# Patient Record
Sex: MALE | Race: WHITE | NOT HISPANIC OR LATINO | Employment: UNEMPLOYED | URBAN - METROPOLITAN AREA
[De-identification: names, ages, dates, MRNs, and addresses within clinical notes are randomized per-mention and may not be internally consistent; named-entity substitution may affect disease eponyms.]

---

## 2024-07-21 ENCOUNTER — OFFICE VISIT (OUTPATIENT)
Dept: URGENT CARE | Facility: CLINIC | Age: 54
End: 2024-07-21
Payer: COMMERCIAL

## 2024-07-21 VITALS
BODY MASS INDEX: 29.99 KG/M2 | TEMPERATURE: 98 F | OXYGEN SATURATION: 98 % | DIASTOLIC BLOOD PRESSURE: 76 MMHG | WEIGHT: 180 LBS | HEIGHT: 65 IN | HEART RATE: 79 BPM | SYSTOLIC BLOOD PRESSURE: 118 MMHG

## 2024-07-21 DIAGNOSIS — L03.113 CELLULITIS OF RIGHT UPPER EXTREMITY: Primary | ICD-10-CM

## 2024-07-21 PROCEDURE — 99213 OFFICE O/P EST LOW 20 MIN: CPT

## 2024-07-21 RX ORDER — CEPHALEXIN 500 MG/1
500 CAPSULE ORAL EVERY 6 HOURS SCHEDULED
Qty: 28 CAPSULE | Refills: 0 | Status: SHIPPED | OUTPATIENT
Start: 2024-07-21 | End: 2024-07-28

## 2024-07-21 NOTE — PATIENT INSTRUCTIONS
Take the antibiotic as prescribed  Cool compresses to the arm  Tylenol Motrin for pain or discomfort  If the area gets larger if you develop chills or fever go to the emergency room

## 2024-07-21 NOTE — PROGRESS NOTES
[facility]        NAME: Adolfo Bond is a 53 y.o. male  : 1970    MRN: [id number]  DATE: 2024  TIME: 10:24 AM    Assessment and Plan   Cellulitis of right upper extremity [L03.113]  1. Cellulitis of right upper extremity  cephalexin (KEFLEX) 500 mg capsule            Patient Instructions   Take the antibiotic as prescribed  Cool compresses to the arm  Tylenol Motrin for pain or discomfort  If the area gets larger if you develop chills or fever go to the emergency room    Follow up with PCP in 3-5 days.  Proceed to  ER if symptoms worsen.    If tests have been performed at [facility], our office will contact you with results if changes need to be made to the care plan discussed with you at the visit.  You can review your full results on [facility].    Chief Complaint     Chief Complaint   Patient presents with    Insect Bite     Patient states that a hornet stung him on Friday on his right arm. The site is read and swollen. He has been icing it and took some benadryl.          History of Present Illness       This is a 53-year-old male who presents today after being stung by a hornet on Friday.  To the right forearm.  He had slight swelling but during the night he states he had increased pain and increased swelling.  He did take Benadryl yesterday with no relief.  He has been taking some over-the-counter generic allergy medicine.  He states that the sting was initially quarter size now it encompasses his proximal right forearm and the elbow.  His arm is swollen and warm to the touch and erythematous.  +2 radial pulse.  He has full range of motion of the arm.  He states that a couple years ago he had the same issue with a bee sting on his leg    Insect Bite  Associated symptoms include myalgias. Pertinent negatives include no diaphoresis or fever.       Review of Systems   Review of Systems   Constitutional: Negative.  Negative for diaphoresis and fever.   HENT: Negative.    "  Respiratory: Negative.     Cardiovascular: Negative.    Gastrointestinal: Negative.    Genitourinary: Negative.    Musculoskeletal:  Positive for myalgias.   Skin:  Positive for wound.   Neurological: Negative.          Current Medications       Current Outpatient Medications:     cephalexin (KEFLEX) 500 mg capsule, Take 1 capsule (500 mg total) by mouth every 6 (six) hours for 7 days, Disp: 28 capsule, Rfl: 0    Current Allergies     Allergies as of 07/21/2024    (No Known Allergies)            The following portions of the patient's history were reviewed and updated as appropriate: allergies, current medications, past family history, past medical history, past social history, past surgical history and problem list.     No past medical history on file.    No past surgical history on file.    No family history on file.      Medications have been verified.        Objective   /76   Pulse 79   Temp 98 °F (36.7 °C)   Ht 5' 5\" (1.651 m)   Wt 81.6 kg (180 lb)   SpO2 98%   BMI 29.95 kg/m²   No LMP for male patient.       Physical Exam     Physical Exam  Constitutional:       Appearance: Normal appearance. He is normal weight.   HENT:      Head: Normocephalic and atraumatic.      Right Ear: Tympanic membrane, ear canal and external ear normal.      Left Ear: Tympanic membrane, ear canal and external ear normal.      Nose: Nose normal.      Mouth/Throat:      Mouth: Mucous membranes are moist.      Pharynx: Oropharynx is clear.   Eyes:      Conjunctiva/sclera: Conjunctivae normal.      Pupils: Pupils are equal, round, and reactive to light.   Cardiovascular:      Rate and Rhythm: Normal rate and regular rhythm.      Pulses: Normal pulses.      Heart sounds: Normal heart sounds.   Pulmonary:      Effort: Pulmonary effort is normal.      Breath sounds: Normal breath sounds.   Abdominal:      General: Abdomen is flat. Bowel sounds are normal.   Musculoskeletal:         General: Normal range of motion.   Skin:     " Capillary Refill: Capillary refill takes less than 2 seconds.      Coloration: Skin is not jaundiced.      Findings: Erythema and rash present. No lesion.             Comments: Patient has swelling erythema and warm from just above his antecubital to his mid forearm.  He states that it occasionally itches.  He is able to bend his elbow without any difficulty radial pulse +2   Neurological:      General: No focal deficit present.      Mental Status: He is alert and oriented to person, place, and time.   Psychiatric:         Mood and Affect: Mood normal.         Thought Content: Thought content normal.         Judgment: Judgment normal.

## 2024-08-05 ENCOUNTER — APPOINTMENT (EMERGENCY)
Dept: RADIOLOGY | Facility: HOSPITAL | Age: 54
End: 2024-08-05
Payer: COMMERCIAL

## 2024-08-05 ENCOUNTER — HOSPITAL ENCOUNTER (EMERGENCY)
Facility: HOSPITAL | Age: 54
Discharge: HOME | End: 2024-08-05
Attending: EMERGENCY MEDICINE
Payer: COMMERCIAL

## 2024-08-05 ENCOUNTER — APPOINTMENT (EMERGENCY)
Dept: CT IMAGING | Facility: HOSPITAL | Age: 54
End: 2024-08-05
Payer: COMMERCIAL

## 2024-08-05 VITALS
HEART RATE: 69 BPM | OXYGEN SATURATION: 98 % | RESPIRATION RATE: 15 BRPM | TEMPERATURE: 98.4 F | SYSTOLIC BLOOD PRESSURE: 115 MMHG | DIASTOLIC BLOOD PRESSURE: 57 MMHG

## 2024-08-05 DIAGNOSIS — N20.1 LEFT URETERAL STONE: Primary | ICD-10-CM

## 2024-08-05 DIAGNOSIS — N13.30 HYDRONEPHROSIS: ICD-10-CM

## 2024-08-05 LAB
ALBUMIN SERPL BCG-MCNC: 4.8 G/DL (ref 3.5–5)
ALP SERPL-CCNC: 55 U/L (ref 34–104)
ALT SERPL W P-5'-P-CCNC: 25 U/L (ref 7–52)
ANION GAP SERPL CALCULATED.3IONS-SCNC: 14 MMOL/L (ref 4–13)
AST SERPL W P-5'-P-CCNC: 19 U/L (ref 13–39)
ATRIAL RATE: 74 BPM
BACTERIA UR QL AUTO: NORMAL /HPF
BASOPHILS # BLD AUTO: 0.04 THOUSANDS/ÂΜL (ref 0–0.1)
BASOPHILS NFR BLD AUTO: 0 % (ref 0–1)
BILIRUB SERPL-MCNC: 0.6 MG/DL (ref 0.2–1)
BILIRUB UR QL STRIP: NEGATIVE
BUN SERPL-MCNC: 17 MG/DL (ref 5–25)
CALCIUM SERPL-MCNC: 9.9 MG/DL (ref 8.4–10.2)
CARDIAC TROPONIN I PNL SERPL HS: 5 NG/L
CHLORIDE SERPL-SCNC: 103 MMOL/L (ref 96–108)
CLARITY UR: CLEAR
CO2 SERPL-SCNC: 20 MMOL/L (ref 21–32)
COLOR UR: YELLOW
CREAT SERPL-MCNC: 1.25 MG/DL (ref 0.6–1.3)
EOSINOPHIL # BLD AUTO: 0.01 THOUSAND/ÂΜL (ref 0–0.61)
EOSINOPHIL NFR BLD AUTO: 0 % (ref 0–6)
ERYTHROCYTE [DISTWIDTH] IN BLOOD BY AUTOMATED COUNT: 12 % (ref 11.6–15.1)
GFR SERPL CREATININE-BSD FRML MDRD: 65 ML/MIN/1.73SQ M
GLUCOSE SERPL-MCNC: 113 MG/DL (ref 65–140)
GLUCOSE UR STRIP-MCNC: NEGATIVE MG/DL
HCT VFR BLD AUTO: 42.4 % (ref 36.5–49.3)
HGB BLD-MCNC: 14.2 G/DL (ref 12–17)
HGB UR QL STRIP.AUTO: ABNORMAL
IMM GRANULOCYTES # BLD AUTO: 0.08 THOUSAND/UL (ref 0–0.2)
IMM GRANULOCYTES NFR BLD AUTO: 1 % (ref 0–2)
KETONES UR STRIP-MCNC: ABNORMAL MG/DL
LEUKOCYTE ESTERASE UR QL STRIP: NEGATIVE
LIPASE SERPL-CCNC: 25 U/L (ref 11–82)
LYMPHOCYTES # BLD AUTO: 1.69 THOUSANDS/ÂΜL (ref 0.6–4.47)
LYMPHOCYTES NFR BLD AUTO: 16 % (ref 14–44)
MCH RBC QN AUTO: 29 PG (ref 26.8–34.3)
MCHC RBC AUTO-ENTMCNC: 33.5 G/DL (ref 31.4–37.4)
MCV RBC AUTO: 87 FL (ref 82–98)
MONOCYTES # BLD AUTO: 0.7 THOUSAND/ÂΜL (ref 0.17–1.22)
MONOCYTES NFR BLD AUTO: 7 % (ref 4–12)
NEUTROPHILS # BLD AUTO: 7.91 THOUSANDS/ÂΜL (ref 1.85–7.62)
NEUTS SEG NFR BLD AUTO: 76 % (ref 43–75)
NITRITE UR QL STRIP: NEGATIVE
NON-SQ EPI CELLS URNS QL MICRO: NORMAL /HPF
NRBC BLD AUTO-RTO: 0 /100 WBCS
P AXIS: 42 DEGREES
PH UR STRIP.AUTO: 7.5 [PH]
PLATELET # BLD AUTO: 366 THOUSANDS/UL (ref 149–390)
PMV BLD AUTO: 9.5 FL (ref 8.9–12.7)
POTASSIUM SERPL-SCNC: 4 MMOL/L (ref 3.5–5.3)
PR INTERVAL: 154 MS
PROT SERPL-MCNC: 7.8 G/DL (ref 6.4–8.4)
PROT UR STRIP-MCNC: NEGATIVE MG/DL
QRS AXIS: 25 DEGREES
QRSD INTERVAL: 106 MS
QT INTERVAL: 408 MS
QTC INTERVAL: 452 MS
RBC # BLD AUTO: 4.9 MILLION/UL (ref 3.88–5.62)
RBC #/AREA URNS AUTO: NORMAL /HPF
SODIUM SERPL-SCNC: 137 MMOL/L (ref 135–147)
SP GR UR STRIP.AUTO: 1.01 (ref 1–1.03)
T WAVE AXIS: 33 DEGREES
UROBILINOGEN UR STRIP-ACNC: <2 MG/DL
VENTRICULAR RATE: 74 BPM
WBC # BLD AUTO: 10.43 THOUSAND/UL (ref 4.31–10.16)
WBC #/AREA URNS AUTO: NORMAL /HPF

## 2024-08-05 PROCEDURE — 71046 X-RAY EXAM CHEST 2 VIEWS: CPT

## 2024-08-05 PROCEDURE — 96374 THER/PROPH/DIAG INJ IV PUSH: CPT

## 2024-08-05 PROCEDURE — 81001 URINALYSIS AUTO W/SCOPE: CPT

## 2024-08-05 PROCEDURE — 80053 COMPREHEN METABOLIC PANEL: CPT

## 2024-08-05 PROCEDURE — 99285 EMERGENCY DEPT VISIT HI MDM: CPT

## 2024-08-05 PROCEDURE — 74176 CT ABD & PELVIS W/O CONTRAST: CPT

## 2024-08-05 PROCEDURE — 93005 ELECTROCARDIOGRAM TRACING: CPT

## 2024-08-05 PROCEDURE — 99284 EMERGENCY DEPT VISIT MOD MDM: CPT

## 2024-08-05 PROCEDURE — 96361 HYDRATE IV INFUSION ADD-ON: CPT

## 2024-08-05 PROCEDURE — 93010 ELECTROCARDIOGRAM REPORT: CPT | Performed by: INTERNAL MEDICINE

## 2024-08-05 PROCEDURE — 84484 ASSAY OF TROPONIN QUANT: CPT

## 2024-08-05 PROCEDURE — 36415 COLL VENOUS BLD VENIPUNCTURE: CPT

## 2024-08-05 PROCEDURE — 83690 ASSAY OF LIPASE: CPT

## 2024-08-05 PROCEDURE — 85025 COMPLETE CBC W/AUTO DIFF WBC: CPT

## 2024-08-05 RX ORDER — IBUPROFEN 600 MG/1
TABLET ORAL
Qty: 18 TABLET | Refills: 0 | Status: SHIPPED | OUTPATIENT
Start: 2024-08-05 | End: 2024-08-10

## 2024-08-05 RX ORDER — ONDANSETRON 2 MG/ML
4 INJECTION INTRAMUSCULAR; INTRAVENOUS ONCE
Status: DISCONTINUED | OUTPATIENT
Start: 2024-08-05 | End: 2024-08-05 | Stop reason: HOSPADM

## 2024-08-05 RX ORDER — KETOROLAC TROMETHAMINE 30 MG/ML
15 INJECTION, SOLUTION INTRAMUSCULAR; INTRAVENOUS ONCE
Status: COMPLETED | OUTPATIENT
Start: 2024-08-05 | End: 2024-08-05

## 2024-08-05 RX ORDER — OXYCODONE HYDROCHLORIDE 5 MG/1
5 TABLET ORAL EVERY 6 HOURS PRN
Qty: 8 TABLET | Refills: 0 | Status: SHIPPED | OUTPATIENT
Start: 2024-08-05

## 2024-08-05 RX ORDER — TAMSULOSIN HYDROCHLORIDE 0.4 MG/1
0.4 CAPSULE ORAL
Qty: 14 CAPSULE | Refills: 0 | Status: SHIPPED | OUTPATIENT
Start: 2024-08-05 | End: 2024-08-19

## 2024-08-05 RX ORDER — ONDANSETRON 4 MG/1
4 TABLET, ORALLY DISINTEGRATING ORAL EVERY 6 HOURS PRN
Qty: 20 TABLET | Refills: 0 | Status: SHIPPED | OUTPATIENT
Start: 2024-08-05

## 2024-08-05 RX ORDER — ACETAMINOPHEN 500 MG
1000 TABLET ORAL 3 TIMES DAILY PRN
Qty: 30 TABLET | Refills: 0 | Status: SHIPPED | OUTPATIENT
Start: 2024-08-05 | End: 2024-08-10

## 2024-08-05 RX ADMIN — SODIUM CHLORIDE 1000 ML: 0.9 INJECTION, SOLUTION INTRAVENOUS at 11:56

## 2024-08-05 RX ADMIN — SODIUM CHLORIDE 1000 ML: 0.9 INJECTION, SOLUTION INTRAVENOUS at 08:46

## 2024-08-05 RX ADMIN — KETOROLAC TROMETHAMINE 15 MG: 30 INJECTION, SOLUTION INTRAMUSCULAR; INTRAVENOUS at 08:59

## 2024-08-05 NOTE — DISCHARGE INSTRUCTIONS
Use the prescribed medications as directed to assist with passing your kidney stone.  Increase your fluids by taking small sips of water throughout the day.  Strain all urine until successful passage of your stone.  Follow-up with urology regarding your new finding of kidney stones.  Return to the ER if develop fever, inability to urinate, new or worsening pain, inability to tolerate fluids, weakness, confusion, or lethargy.

## 2024-08-05 NOTE — ED PROVIDER NOTES
"History  Chief Complaint   Patient presents with    Flank Pain     Pt6 reports left sided flank pain. Pt states \"I think its kidney stones. I am starting to get nauseous. I had blood in my urine.\"     The patient is a 53-year-old male with no significant PMH presenting for evaluation of 4 days of left flank pain.  The patient started on Friday (4 days PTA) with nonradiating left flank pain described as intermittent, sharp, dull at times, with associated nausea and sometimes sweatiness.  The patient reports associated hematuria which raises his concern for a kidney stone.  He has no formal documentation of prior kidney stone but believes he may have had one approximately 7 years ago.  He denies associated fevers or chills.  He does report having a URI a few weeks ago with lingering dry nonproductive cough.  He does report the pain is so severe at times takes his breath away.  He denies associated lightheadedness/dizziness, chest pain, persistent SOB, palpitations, abdominal pain, change in bowel, dysuria, urinary urgency, frequency, or foul-smelling urine.  He notes a prior umbilical hernia repair many years ago, he otherwise denies all other abdominal surgical history.  He notes his most recent episode of pain was from 1 AM until this morning with an episode of emesis (NBNB) for which he presents today for further evaluation.      History provided by:  Patient and significant other   used: No    Flank Pain  Associated symptoms: hematuria, nausea and vomiting    Associated symptoms: no chest pain, no chills, no dysuria, no fever and no shortness of breath        None       History reviewed. No pertinent past medical history.    History reviewed. No pertinent surgical history.    History reviewed. No pertinent family history.  I have reviewed and agree with the history as documented.    E-Cigarette/Vaping     E-Cigarette/Vaping Substances          Review of Systems   Constitutional:  Positive for " diaphoresis. Negative for chills and fever.   Respiratory:  Negative for shortness of breath.         Denies shortness of breath, notes the pain sometimes takes his breath away   Cardiovascular:  Negative for chest pain and palpitations.   Gastrointestinal:  Positive for nausea and vomiting. Negative for abdominal pain.   Genitourinary:  Positive for flank pain (Left-sided) and hematuria. Negative for decreased urine volume, difficulty urinating, dysuria, frequency, scrotal swelling, testicular pain and urgency.   Musculoskeletal:  Positive for back pain (Left middle back). Negative for gait problem.   Skin:  Negative for color change, rash and wound.   All other systems reviewed and are negative.      Physical Exam  Physical Exam  Vitals and nursing note reviewed.   Constitutional:       General: He is not in acute distress.     Appearance: Normal appearance. He is normal weight. He is not ill-appearing or toxic-appearing.   HENT:      Head: Normocephalic and atraumatic.      Nose: Nose normal.      Mouth/Throat:      Mouth: Mucous membranes are moist.      Pharynx: Oropharynx is clear.   Eyes:      Extraocular Movements: Extraocular movements intact.      Conjunctiva/sclera: Conjunctivae normal.   Cardiovascular:      Rate and Rhythm: Normal rate and regular rhythm.      Pulses:           Radial pulses are 2+ on the right side and 2+ on the left side.      Heart sounds: Normal heart sounds, S1 normal and S2 normal. No murmur heard.     No systolic murmur is present.   Pulmonary:      Effort: Pulmonary effort is normal. No respiratory distress.      Breath sounds: Normal breath sounds and air entry.   Abdominal:      General: There is no distension.      Palpations: Abdomen is soft.      Tenderness: There is abdominal tenderness in the left upper quadrant and left lower quadrant. There is left CVA tenderness. There is no right CVA tenderness, guarding or rebound. Negative signs include Stewart's sign.    Musculoskeletal:         General: Normal range of motion.      Cervical back: Normal range of motion and neck supple.   Skin:     General: Skin is warm and dry.      Capillary Refill: Capillary refill takes less than 2 seconds.      Findings: No rash.   Neurological:      General: No focal deficit present.      Mental Status: He is alert and oriented to person, place, and time. Mental status is at baseline.         Vital Signs  ED Triage Vitals [08/05/24 0827]   Temperature Pulse Respirations Blood Pressure SpO2   98.4 °F (36.9 °C) 88 18 135/80 100 %      Temp Source Heart Rate Source Patient Position - Orthostatic VS BP Location FiO2 (%)   Oral Monitor Standing Left arm --      Pain Score       6           Vitals:    08/05/24 0827 08/05/24 0930 08/05/24 1000 08/05/24 1200   BP: 135/80 116/64 118/59 115/57   Pulse: 88 79 79 69   Patient Position - Orthostatic VS: Standing Lying  Lying         Visual Acuity  Visual Acuity      Flowsheet Row Most Recent Value   L Pupil Size (mm) 3   R Pupil Size (mm) 3            ED Medications  Medications   ondansetron (ZOFRAN) injection 4 mg (0 mg Intravenous Hold 8/5/24 0846)   sodium chloride 0.9 % bolus 1,000 mL (1,000 mL Intravenous New Bag 8/5/24 1156)   sodium chloride 0.9 % bolus 1,000 mL (0 mL Intravenous Stopped 8/5/24 1110)   ketorolac (TORADOL) injection 15 mg (15 mg Intravenous Given 8/5/24 0859)       Diagnostic Studies  Results Reviewed       Procedure Component Value Units Date/Time    Urine Microscopic [[id number]]  (Normal) Collected: 08/05/24 1037    Lab Status: Final result Specimen: Urine, Clean Catch Updated: 08/05/24 1102     RBC, UA 1-2 /hpf      WBC, UA 0-1 /hpf      Epithelial Cells None Seen /hpf      Bacteria, UA None Seen /hpf     UA w Reflex to Microscopic w Reflex to Culture [[id number]]  (Abnormal) Collected: 08/05/24 1037    Lab Status: Final result Specimen: Urine, Clean Catch Updated: 08/05/24 1045     Color, UA Yellow     Clarity, UA Clear      Specific Gravity, UA 1.010     pH, UA 7.5     Leukocytes, UA Negative     Nitrite, UA Negative     Protein, UA Negative mg/dl      Glucose, UA Negative mg/dl      Ketones, UA 10 (1+) mg/dl      Urobilinogen, UA <2.0 mg/dl      Bilirubin, UA Negative     Occult Blood, UA Trace    HS Troponin 0hr (reflex protocol) [[id number]]  (Normal) Collected: 08/05/24 0845    Lab Status: Final result Specimen: Blood from Arm, Left Updated: 08/05/24 0916     hs TnI 0hr 5 ng/L     Comprehensive metabolic panel [[id number]]  (Abnormal) Collected: 08/05/24 0845    Lab Status: Final result Specimen: Blood from Arm, Left Updated: 08/05/24 0910     Sodium 137 mmol/L      Potassium 4.0 mmol/L      Chloride 103 mmol/L      CO2 20 mmol/L      ANION GAP 14 mmol/L      BUN 17 mg/dL      Creatinine 1.25 mg/dL      Glucose 113 mg/dL      Calcium 9.9 mg/dL      AST 19 U/L      ALT 25 U/L      Alkaline Phosphatase 55 U/L      Total Protein 7.8 g/dL      Albumin 4.8 g/dL      Total Bilirubin 0.60 mg/dL      eGFR 65 ml/min/1.73sq m     Narrative:      National Kidney Disease Foundation guidelines for Chronic Kidney Disease (CKD):     Stage 1 with normal or high GFR (GFR > 90 mL/min/1.73 square meters)    Stage 2 Mild CKD (GFR = 60-89 mL/min/1.73 square meters)    Stage 3A Moderate CKD (GFR = 45-59 mL/min/1.73 square meters)    Stage 3B Moderate CKD (GFR = 30-44 mL/min/1.73 square meters)    Stage 4 Severe CKD (GFR = 15-29 mL/min/1.73 square meters)    Stage 5 End Stage CKD (GFR <15 mL/min/1.73 square meters)  Note: GFR calculation is accurate only with a steady state creatinine    Lipase [[id number]]  (Normal) Collected: 08/05/24 0845    Lab Status: Final result Specimen: Blood from Arm, Left Updated: 08/05/24 0910     Lipase 25 u/L     CBC and differential [[id number]]  (Abnormal) Collected: 08/05/24 0845    Lab Status: Final result Specimen: Blood from Arm, Left Updated: 08/05/24 0855     WBC 10.43 Thousand/uL      RBC 4.90 Million/uL       Hemoglobin 14.2 g/dL      Hematocrit 42.4 %      MCV 87 fL      MCH 29.0 pg      MCHC 33.5 g/dL      RDW 12.0 %      MPV 9.5 fL      Platelets 366 Thousands/uL      nRBC 0 /100 WBCs      Segmented % 76 %      Immature Grans % 1 %      Lymphocytes % 16 %      Monocytes % 7 %      Eosinophils Relative 0 %      Basophils Relative 0 %      Absolute Neutrophils 7.91 Thousands/µL      Absolute Immature Grans 0.08 Thousand/uL      Absolute Lymphocytes 1.69 Thousands/µL      Absolute Monocytes 0.70 Thousand/µL      Eosinophils Absolute 0.01 Thousand/µL      Basophils Absolute 0.04 Thousands/µL                    CT abdomen pelvis wo contrast   Final Result by Matt Mcgarry MD (08/05 0922)      Mild left-sided hydroureteronephrosis to the level of a 4 mm obstructing distal ureteral calculus adjacent to the urinary bladder.      Punctate nonobstructing right intrarenal calculus.      Prostatomegaly indenting the urinary bladder base.      The study was marked in EPIC for immediate notification.      Workstation performed: [id number]         XR chest 2 views   Final Result by Dipak Rader MD (08/05 0916)      No acute cardiopulmonary disease.            Workstation performed: [id number]                    Procedures  ECG 12 Lead Documentation Only    Date/Time: 8/5/2024 8:55 AM    Performed by: WOLF Haddad  Authorized by: WOFL Haddad    Indications / Diagnosis:  Flank pain, N/V  ECG reviewed by me, the ED Provider: yes    Patient location:  ED  Previous ECG:     Previous ECG:  Unavailable    Comparison to cardiac monitor: Yes    Interpretation:     Interpretation: non-specific    Rate:     ECG rate:  74    ECG rate assessment: normal    Rhythm:     Rhythm: sinus rhythm    Ectopy:     Ectopy: none    QRS:     QRS axis:  Normal    QRS intervals:  Normal  Conduction:     Conduction: normal    ST segments:     ST segments:  Normal  T waves:     T waves: inverted      Inverted:  III  Comments:      Sinus rhythm,  normal axis, normal intervals, nonspecific T wave O'Elizabet, no acute ischemic changes read by me           ED Course  ED Course as of 08/05/24 1219   Mon Aug 05, 2024   0920 XR chest 2 views  IMPRESSION:     No acute cardiopulmonary disease.     0930 CT abdomen pelvis wo contrast  IMPRESSION:     Mild left-sided hydroureteronephrosis to the level of a 4 mm obstructing distal ureteral calculus adjacent to the urinary bladder.     Punctate nonobstructing right intrarenal calculus.     Prostatomegaly indenting the urinary bladder base.                 HEART Risk Score      Flowsheet Row Most Recent Value   Heart Score Risk Calculator    History 0 Filed at: 08/05/2024 1219   ECG 0 Filed at: 08/05/2024 1219   Age 1 Filed at: 08/05/2024 1219   Risk Factors 1 Filed at: 08/05/2024 1219   Troponin 0 Filed at: 08/05/2024 1219   HEART Score 2 Filed at: 08/05/2024 1219                          SBIRT 20yo+      Flowsheet Row Most Recent Value   Initial Alcohol Screen: US AUDIT-C     1. How often do you have a drink containing alcohol? 0 Filed at: 08/05/2024 0839   2. How many drinks containing alcohol do you have on a typical day you are drinking?  0 Filed at: 08/05/2024 0839   3a. Male UNDER 65: How often do you have five or more drinks on one occasion? 0 Filed at: 08/05/2024 0839   Audit-C Score 0 Filed at: 08/05/2024 0839   JOE: How many times in the past year have you...    Used an illegal drug or used a prescription medication for non-medical reasons? Never Filed at: 08/05/2024 0839                      Medical Decision Making  DDx including but not limited to: renal colic, pyelonephritis, UTI, GI etiology; considered but less likely appendicitis, diverticulitis, pancreatitis, cholecystitis, biliary colic, zoster.      Labs, UA, CT imaging obtained.  Labs without electrolyte derangement, organ dysfunction, or anemia.  UA without evidence of infection.  CT imaging with finding of distal left 4 mm ureteral calculus.  Likely  source of patient's left-sided renal colic.  Given fluids and Toradol with notable improvement in pain.  Plan made for referral to urology for first-time finding of nephrolithiasis and ureteral stone, NSAIDs, Tylenol, as needed Zofran and oxycodone, and strict return precautions.    Problems Addressed:  Hydronephrosis: self-limited or minor problem  Left ureteral stone: acute illness or injury    Amount and/or Complexity of Data Reviewed  Independent Historian: spouse  Labs: ordered.  Radiology: ordered. Decision-making details documented in ED Course.  ECG/medicine tests: ordered and independent interpretation performed.    Risk  OTC drugs.  Prescription drug management.                 Disposition  Final diagnoses:   Left ureteral stone   Hydronephrosis     Time reflects when diagnosis was documented in both MDM as applicable and the Disposition within this note       Time User Action Codes Description Comment    8/5/2024 11:20 AM Joyce Booth [N20.1] Left ureteral stone     8/5/2024 11:20 AM Joyce Booth [N13.30] Hydronephrosis           ED Disposition       ED Disposition   Discharge    Condition   Stable    Date/Time   Mon Aug 5, 2024 1120    Comment   Adolfo Bond discharge to home/self care.                   Follow-up Information       Follow up With Specialties Details Why Contact Info Additional Information    [facility] Schedule an appointment as soon as possible for a visit in 1 week  [address]  [address] [address]  [phone] [facility], [address] [address], [place], [place], [address]   [phone]            Patient's Medications   Discharge Prescriptions    ACETAMINOPHEN (TYLENOL) 500 MG TABLET    Take 2 tablets (1,000 mg total) by mouth 3 (three) times a day as needed for mild pain or moderate pain for up to 5 days       Start Date: 8/5/2024  End Date: 8/10/2024       Order Dose:  1,000 mg       Quantity: 30 tablet    Refills: 0    IBUPROFEN (MOTRIN) 600 MG TABLET    Take 1 tablet (600 mg total) by mouth 3 (three) times a day with meals for 3 days, THEN 1 tablet (600 mg total) 3 (three) times a day as needed for mild pain for up to 3 days.       Start Date: 8/5/2024  End Date: 8/10/2024       Order Dose: --       Quantity: 18 tablet    Refills: 0    ONDANSETRON (ZOFRAN-ODT) 4 MG DISINTEGRATING TABLET    Take 1 tablet (4 mg total) by mouth every 6 (six) hours as needed for nausea or vomiting       Start Date: 8/5/2024  End Date: --       Order Dose: 4 mg       Quantity: 20 tablet    Refills: 0    OXYCODONE (ROXICODONE) 5 IMMEDIATE RELEASE TABLET    Take 1 tablet (5 mg total) by mouth every 6 (six) hours as needed for severe pain Max Daily Amount: 20 mg       Start Date: 8/5/2024  End Date: --       Order Dose: 5 mg       Quantity: 8 tablet    Refills: 0    TAMSULOSIN (FLOMAX) 0.4 MG    Take 1 capsule (0.4 mg total) by mouth daily with dinner for 14 days       Start Date: 8/5/2024  End Date: 8/19/2024       Order Dose: 0.4 mg       Quantity: 14 capsule    Refills: 0           PDMP Review       None            ED Provider  Electronically Signed by             WOLF Haddad  08/05/24 1319

## 2024-08-06 ENCOUNTER — TELEPHONE (OUTPATIENT)
Age: 54
End: 2024-08-06

## 2024-08-06 NOTE — TELEPHONE ENCOUNTER
Pt returning call to [facility] regarding previous message.  Pt was questioning t5he phone number that will come up when his call is returned.  Suggested to pt that he answer whichever incoming call he gets to assure he does not miss the returned call.  Please review.    Pt call back: [phone]

## 2024-08-06 NOTE — TELEPHONE ENCOUNTER
NP- ref from ER for Left ureteral stone         Patient was seen in ER on 8/5/24     Patient had CT done on 8/5/24:    IMPRESSION:     Mild left-sided hydroureteronephrosis to the level of a 4 mm obstructing distal ureteral calculus adjacent to the urinary bladder.     Punctate nonobstructing right intrarenal calculus.     Prostatomegaly indenting the urinary bladder base.     The study was marked in EPIC for immediate notification.    Due to obstruction/hydro patient will need to be placed in a more urgent spot. Please call patient to schedule in appropriate time frame. Patient states he is still in pain at this time.       CB:  [phone]

## 2024-08-09 ENCOUNTER — OFFICE VISIT (OUTPATIENT)
Dept: UROLOGY | Facility: MEDICAL CENTER | Age: 54
End: 2024-08-09
Payer: COMMERCIAL

## 2024-08-09 VITALS
WEIGHT: 181 LBS | OXYGEN SATURATION: 96 % | DIASTOLIC BLOOD PRESSURE: 78 MMHG | SYSTOLIC BLOOD PRESSURE: 110 MMHG | HEIGHT: 65 IN | BODY MASS INDEX: 30.16 KG/M2 | HEART RATE: 76 BPM

## 2024-08-09 DIAGNOSIS — N40.1 BENIGN PROSTATIC HYPERPLASIA WITH URINARY OBSTRUCTION: ICD-10-CM

## 2024-08-09 DIAGNOSIS — N20.0 RIGHT KIDNEY STONE: ICD-10-CM

## 2024-08-09 DIAGNOSIS — N13.8 BENIGN PROSTATIC HYPERPLASIA WITH URINARY OBSTRUCTION: ICD-10-CM

## 2024-08-09 DIAGNOSIS — Z12.11 ENCOUNTER FOR SCREENING COLONOSCOPY: ICD-10-CM

## 2024-08-09 DIAGNOSIS — N20.1 LEFT URETERAL STONE: Primary | ICD-10-CM

## 2024-08-09 PROCEDURE — 99204 OFFICE O/P NEW MOD 45 MIN: CPT | Performed by: UROLOGY

## 2024-08-09 PROCEDURE — 82360 CALCULUS ASSAY QUANT: CPT | Performed by: UROLOGY

## 2024-08-09 NOTE — ASSESSMENT & PLAN NOTE
Patient has never had a colonoscopy.  I do feel this is indicated at age 53 and did place a consultation to gastroenterology.

## 2024-08-09 NOTE — ASSESSMENT & PLAN NOTE
AUA symptom score is 3.  He is satisfied his voiding pattern.  He declined HANANE today.  I did recommend PSA testing.

## 2024-08-09 NOTE — ASSESSMENT & PLAN NOTE
A punctate right kidney stone is noted.  Is asymptomatic.  In retrospect the patient may have had another stone in the past.  I did recommend we proceed with a formal metabolic stone evaluation to include blood work and 24-hour urine testing.

## 2024-08-09 NOTE — PROGRESS NOTES
Ambulatory Visit  Name: Adolfo Bond      : 1970      MRN: [id number]  Encounter Provider: Franky Brown MD  Encounter Date: 2024   Encounter department: [facility] [place]    Assessment & Plan   1. Left ureteral stone  Assessment & Plan:  The stone has passed and we will send for stone analysis.  Orders:  -     Ambulatory Referral to Urology  -     Stone analysis  -     Urinalysis with microscopic; Future; Expected date: 2024  -     Basic metabolic panel; Future; Expected date: 2024  -     Calcium; Future; Expected date: 2024  -     Magnesium; Future  -     Phosphorus; Future; Expected date: 2024  -     PTH, intact; Future; Expected date: 2024  -     Uric acid; Future  -     [facility] Kidney Stone Panel; Future; Expected date: 2024  2. Right kidney stone  Assessment & Plan:  A punctate right kidney stone is noted.  Is asymptomatic.  In retrospect the patient may have had another stone in the past.  I did recommend we proceed with a formal metabolic stone evaluation to include blood work and 24-hour urine testing.  Orders:  -     [facility] Kidney Stone Panel; Future; Expected date: 2024  3. Benign prostatic hyperplasia with urinary obstruction  Assessment & Plan:  AUA symptom score is 3.  He is satisfied his voiding pattern.  He declined HANANE today.  I did recommend PSA testing.  Orders:  -     PSA Total, Diagnostic; Future; Expected date: 2024  4. Encounter for screening colonoscopy  Assessment & Plan:  Patient has never had a colonoscopy.  I do feel this is indicated at age 53 and did place a consultation to gastroenterology.  Orders:  -     Ambulatory Referral to Gastroenterology; Future; Expected date: 2024      History of Present Illness     Adolfo Bond is a 53 y.o. male who presents in consultation regarding a left ureteral stone.  Last week he developed intermittent left flank pain that worsened in severity  prompting a visit to the emergency room on August 5.  The patient had severe left flank pain associated with nausea.  No fever or chills.  Hematuria was noted.  He was discharged for outpatient observation and 2 days later he did pass a stone and successfully collected it.  He reports he normally voids with an adequate stream and feels he empties his bladder.  He gets up once at night.  At the present time he feels well but he does note intermittent twinges in his left flank.  He feels he may have passed another stone approximately 7 years ago.  He is seen today in consultation.    Review of Systems   Constitutional: Negative.  Negative for chills, diaphoresis, fatigue and fever.   HENT: Negative.     Eyes: Negative.    Respiratory: Negative.     Cardiovascular: Negative.    Endocrine: Negative.    Genitourinary:         See HPI   Musculoskeletal: Negative.    Skin: Negative.    Allergic/Immunologic: Negative.    Neurological: Negative.    Hematological: Negative.    Psychiatric/Behavioral: Negative.       AUA SYMPTOM SCORE      Flowsheet Row Most Recent Value   AUA SYMPTOM SCORE    How often have you had a sensation of not emptying your bladder completely after you finished urinating? 0   How often have you had to urinate again less than two hours after you finished urinating? 0   How often have you found you stopped and started again several times when you urinate? 1   How often have you found it difficult to postpone urination? 0   How often have you had a weak urinary stream? 1   How often have you had to push or strain to begin urination? 0   How many times did you most typically get up to urinate from the time you went to bed at night until the time you got up in the morning? 1   Quality of Life: If you were to spend the rest of your life with your urinary condition just the way it is now, how would you feel about that? 2   AUA SYMPTOM SCORE 3               Objective     /78 (BP Location: Left arm,  "Patient Position: Sitting, Cuff Size: Adult)   Pulse 76   Ht 5' 5\" (1.651 m)   Wt 82.1 kg (181 lb)   SpO2 96%   BMI 30.12 kg/m²   Physical Exam  Vitals reviewed.   Constitutional:       General: He is not in acute distress.     Appearance: Normal appearance. He is well-developed and normal weight. He is not ill-appearing, toxic-appearing or diaphoretic.   HENT:      Head: Normocephalic and atraumatic.   Eyes:      General: No scleral icterus.     Conjunctiva/sclera: Conjunctivae normal.   Cardiovascular:      Rate and Rhythm: Normal rate.   Pulmonary:      Effort: Pulmonary effort is normal.   Abdominal:      General: Bowel sounds are normal. There is no distension.      Palpations: Abdomen is soft. There is no mass.      Tenderness: There is no abdominal tenderness. There is no right CVA tenderness, left CVA tenderness, guarding or rebound.      Hernia: No hernia is present.   Genitourinary:     Penis: Normal. No phimosis or hypospadias.       Testes: Normal.         Right: Mass not present.         Left: Mass not present.      Comments: Declined HANANE  Musculoskeletal:         General: Normal range of motion.      Cervical back: Neck supple.   Skin:     General: Skin is warm and dry.   Neurological:      General: No focal deficit present.      Mental Status: He is alert and oriented to person, place, and time.   Psychiatric:         Mood and Affect: Mood normal.         Behavior: Behavior normal.         Thought Content: Thought content normal.         Judgment: Judgment normal.       Results  No results found for: \"PSA\"  Lab Results   Component Value Date    CALCIUM 9.9 08/05/2024    K 4.0 08/05/2024    CO2 20 (L) 08/05/2024     08/05/2024    BUN 17 08/05/2024    CREATININE 1.25 08/05/2024     Lab Results   Component Value Date    WBC 10.43 (H) 08/05/2024    HGB 14.2 08/05/2024    HCT 42.4 08/05/2024    MCV 87 08/05/2024     08/05/2024       Office Urine Dip  No results found for this or any " previous visit (from the past 1 hour(s)).]    Administrative Statements

## 2024-08-09 NOTE — PATIENT INSTRUCTIONS
"  Patient Education     Cancer screening   The Basics   Written by the doctors and editors at [facility]   What is cancer screening? -- Cancer screening is a way in which doctors check you for some types of cancers before you have any symptoms. Screening also involves looking for areas that can turn into cancer, or \"pre-cancers.\" The goal of cancer screening is to find pre-cancers and cancers as early as possible, so you can get treatment and have the best possible outcome.  Different tests can be used to screen for different types of cancers. The age at which screening starts depends on the type of cancer being screened for. That's because different cancers tend to show up at different times in a person's life.  Why should I have cancer screening? -- Cancer that is found early is often small and can sometimes be cured or treated easily. Treating certain cancers early might help people live longer. Sometimes, screening finds cells that do not yet show cancer, but that might turn into cancer cells. Doctors often treat this pre-cancer before it has a chance to become cancer.  Does everyone have the same cancer screening? -- No. Not everyone is screened for the same types of cancer. And not everyone begins cancer screening at the same age. For example, people with a family history of certain cancers might begin screening at a younger age than people without a family history. People might have repeat screening tests at different times, too. Ask your doctor or nurse:   Which cancers should I be screened for?   Are there choices to make about which screening tests to have?   At what age should I begin cancer screening?   How often should I be screened?  Does an abnormal screening test result mean that I have cancer? -- No, an abnormal screening test result does not mean that you definitely have cancer. It might mean that you have a condition other than cancer, which might not be serious at all. Or it might mean that you do " "have cancer or pre-cancer.  If you have an abnormal result, your doctor or nurse will probably need to do other tests to find out for sure if anything is wrong. Try not to worry about having cancer until you follow up with your doctor or nurse.  Which cancers can people be screened for? -- Some of the types of cancer for which screening tests are available are:   Breast cancer - The main test used to screen for breast cancer is called a \"mammogram.\" Doctors do not always agree about when people should start having mammograms. But most people start around age 40 or 50. People who have a strong family history of breast cancer might begin screening earlier. Work with your doctor or nurse to decide when to start breast cancer screening and at what age you might stop screening.   Colon cancer - There are multiple kinds of screening tests for colon cancer. The choice of which test to have is up to you and your doctor. Doctors recommend that most people begin having colon cancer screening at around age 45. Some people have an increased chance of getting colon cancer, because of a strong family history or certain medical conditions. These people might begin screening at a younger age.   Cervical cancer - There are several ways to have cervical cancer screening, but one of the most common tests is called a \"Pap smear\" or \"Pap test.\" For people with a cervix, screening with a Pap test often begins at age 21, although in some cases, screening begins at age 25. Doctors might add or switch to another screening test, called an \"HPV test,\" after age 30. People who are older than 65 might or might not need to continue cervical cancer screening. If you are older than 65, talk with your doctor about whether or not you should keep getting screened.   Prostate cancer - The main test used to screen for prostate cancer is called a \"PSA test.\" It is unclear whether or not getting screened for prostate cancer can extend a person's life. " "For this reason, most experts recommend that everyone with a prostate work with their doctor to decide whether screening is right for them. In most cases, people should start discussing prostate cancer screening around age 50. For some people, prostate cancer screening can begin around age 40 if they are at higher risk. This includes Black people, people who have a relative with prostate cancer, and people who have a certain abnormal gene. Most doctors do not recommend screening for people age 70 or older or for those with serious health problems.   Lung cancer - The main test used to screen for lung cancer is an imaging test called a \"low-dose CT scan.\" For people at increased risk of lung cancer, screening can reduce your chance of dying from lung cancer. If you are 50 to 80 years old, and smoke cigarettes or used to smoke cigarettes, ask your doctor if you should be screened for lung cancer. The best way to reduce your chance of getting or dying from lung cancer is to stop smoking.   Ovarian cancer - Most people do not need to think about screening for ovarian cancer. If you are at high risk, your doctor might suggest screening. This might be the case if someone in your family had ovarian or breast cancer, or if your family has a strong history of other cancers.  All topics are updated as new evidence becomes available and our peer review process is complete.  This topic retrieved from [employer] on: Feb 26, 2024.  Topic 39292 Version 16.0  Release: 32.2.4 - C32.56  © 2024 UpToDate, Inc. and/or its affiliates. All rights reserved.  Consumer Information Use and Disclaimer   Disclaimer: This generalized information is a limited summary of diagnosis, treatment, and/or medication information. It is not meant to be comprehensive and should be used as a tool to help the user understand and/or assess potential diagnostic and treatment options. It does NOT include all information about conditions, treatments, medications, " side effects, or risks that may apply to a specific patient. It is not intended to be medical advice or a substitute for the medical advice, diagnosis, or treatment of a health care provider based on the health care provider's examination and assessment of a patient's specific and unique circumstances. Patients must speak with a health care provider for complete information about their health, medical questions, and treatment options, including any risks or benefits regarding use of medications. This information does not endorse any treatments or medications as safe, effective, or approved for treating a specific patient. UpToDate, Inc. and its affiliates disclaim any warranty or liability relating to this information or the use thereof.The use of this information is governed by the Terms of Use, available at https://www.Openfolio.com/en/know/clinical-effectiveness-terms. 2024© UpToDate, Inc. and its affiliates and/or licensors. All rights reserved.  Copyright   © 2024 UpToDate, Inc. and/or its affiliates. All rights reserved.    Patient Education     Kidney stone diet   The Basics   Written by the doctors and editors at [employer]   What are kidney stones? -- Kidney stones are just what they sound like: small stones that form inside the kidneys. They form when salts and minerals that are normally in the urine build up and harden. They can be made of different substances.  Kidney stones usually get carried out of the body when you urinate. But sometimes, they can get stuck on the way out (figure 1). If this happens, it can cause:   Pain in your side, back, or in the lower part of your belly   Blood in the urine (which can make urine pink or red)   Nausea or vomiting   Pain when you urinate   Needing to urinate in a hurry  Why do I need a special diet? -- Depending on what your stones are made of, changing your diet might help lower the chances of new stones forming.  Your doctor or nurse might recommend diet  "changes as part of your treatment plan if you have:   Calcium oxalate stones - When your body digests certain foods, it makes a waste product called \"oxalate.\" If you have too much oxalate in your urine, crystals can form and stick together to make a kidney stone.   Uric acid stones - When your body digests substances called purines, which are found in some foods, it makes a waste product called \"uric acid.\" Kidney stones can form when too much uric acid builds up in your body.   Other types of stones - Your body needs a balance of the right amounts of fluids and minerals to work well. Changes in certain minerals or how much you drink can affect your risk of kidney stones.  What can I eat and drink on a kidney stone diet? -- There is no specific diet plan to prevent kidney stones. Foods that are good to eat for 1 type of kidney stone might not be good to eat with another type of kidney stone. Your diet recommendations might be based on the exact type of kidney stone you have.  General recommendations include:   Eat healthy - Try to eat a healthy diet with plenty of vegetables, fruits, whole grains, and low-fat dairy products. It might help to add extra fruits and vegetables, especially those that are high in potassium.   Get plenty of fluids - Drinking more water throughout the day is one of the best ways to help lower your risk of all types of kidney stones.   Limit salt - Limiting the amount of salt in your diet can also lower the risk of kidney stones. Salt is also called \"sodium.\"  Some foods that are generally OK to eat:   Grains - Whole-grain breads, pastas, cereals, rice, English muffins, and bagels. Cooked hot cereals such as oatmeal and cream of wheat (not instant cereals). Unsalted crackers and snack foods.   Fruits - Most fresh, frozen, or canned fruits in their own juices. Fruit juices without added sugar, cherries, oranges, melons, peaches, pears, kiwi, apples, papaya, bananas. Dried fruit without added " sugars.   Vegetables - Fresh or frozen vegetables, canned vegetables without salt, potatoes, tomatoes, squash, bell peppers, beets, carrots, beans.   Dairy - Low-fat or fat-free milk, yogurt, and cheese.   Meats, poultry, seafood, and proteins - Eat a moderate amount of protein. Good sources of protein for most people with kidney stones include dried beans, peas, lentils, tofu, and walnuts. Other nuts and nut butters might be good sources of protein to eat, based on the kind of kidney stone you have.   Condiments and other foods - Fresh or dried herbs, lemon juice, seasonings without salt, mustard, vinegar, hot sauce.   Fluids - Drink plenty of fluids such as water, unsweetened tea, and coffee.  What foods and drinks should I avoid or limit on a kidney stone diet? -- Depending on what type of kidney stone you have had, limiting certain foods might help lower the risk of new stones forming.  For example:   Calcium oxalate stones - Limit foods and drinks with a lot of oxalate. Examples include spinach, rhubarb, strawberries, chocolate, almonds, peanuts, pecans, beets, tea, whole-wheat products, non-dairy animal proteins like meat and eggs, and foods high in added sucrose and fructose, which are types of sugar. Do not take vitamin C or calcium supplements.   Uric acid stones - Limit foods with purines. Examples include oats, whole milk and whole-milk products, asparagus, spinach, and certain meats, fish, and poultry.  Your doctor or nurse can talk to you about whether it makes sense to avoid or limit certain foods. It can also help to work with a dietitian (food expert). They can help you make sure that your body is getting the nutrients it needs.  What else should I know? -- Getting the right amount of calcium in your diet is important for healthy bones. But having too much or too little calcium can cause some types of kidney stones to form. Talk with your doctor, nurse, or dietitian about how much calcium you should  have. Talk to them before taking calcium or vitamin D supplements.  Depending on your weight and health, it might help to try to lose weight. Keeping a healthy body weight can help prevent kidney stones.  All topics are updated as new evidence becomes available and our peer review process is complete.  This topic retrieved from [employer] on: Mar 09, 2024.  Topic 667229 Version 2.0  Release: 32.2.4 - C32.67  © 2024 UpToDate, Inc. and/or its affiliates. All rights reserved.  figure 1: Anatomy of the urinary tract     Urine is made by the kidneys. It passes from the kidneys into the bladder through 2 tubes called the ureters. Then, it leaves the bladder through another tube called the urethra.  Graphic 24321 Version 8.0  Consumer Information Use and Disclaimer   Disclaimer: This generalized information is a limited summary of diagnosis, treatment, and/or medication information. It is not meant to be comprehensive and should be used as a tool to help the user understand and/or assess potential diagnostic and treatment options. It does NOT include all information about conditions, treatments, medications, side effects, or risks that may apply to a specific patient. It is not intended to be medical advice or a substitute for the medical advice, diagnosis, or treatment of a health care provider based on the health care provider's examination and assessment of a patient's specific and unique circumstances. Patients must speak with a health care provider for complete information about their health, medical questions, and treatment options, including any risks or benefits regarding use of medications. This information does not endorse any treatments or medications as safe, effective, or approved for treating a specific patient. UpToDate, Inc. and its affiliates disclaim any warranty or liability relating to this information or the use thereof.The use of this information is governed by the Terms of Use, available at  https://www.wolterskluwer.com/en/know/clinical-effectiveness-terms. 2024© UpToDate, Inc. and its affiliates and/or licensors. All rights reserved.  Copyright   © 2024 UpToDate, Inc. and/or its affiliates. All rights reserved.    Patient Education     Kidney Stone, Adult ED   General Information   You came to the Emergency Department (ED) for kidney stones. Most of the time a kidney stone leaves your body when you urinate. Sometimes the kidney stone can get stuck on the way out and then you have pain in your lower back, side, or lower belly. You can also have blood in your urine and it may hurt when you urinate. The staff feel it is safe for you to try and pass your kidney stone at home.  What care is needed at home?   Call your regular doctor to let them know you were in the ED. Make a follow-up appointment if you were told to.  Drink lots of fluids to help pass your kidney stone.  You may be asked to use a filter to strain your urine. The filter catches the stones.  Your doctor may want to send the stones to a lab to test them.  You may need to take medicine to help with the pain as your kidney stone passes.  When do I need to get emergency help?   Return to the ED if:   You do not urinate for more than 8 hours.  When do I need to call the doctor?   You have a fever of 100.4°F (38°C) or higher or chills.  Your urine is cloudy, smells bad, or is more bloody.  The pain from your kidney stone gets very bad and is not helped by pain medicine.  You are throwing up and can’t keep liquids down.  Your pain does not go away after 1 to 2 weeks  You have new or worsening symptoms.  Last Reviewed Date   2020-06-24  Consumer Information Use and Disclaimer   This generalized information is a limited summary of diagnosis, treatment, and/or medication information. It is not meant to be comprehensive and should be used as a tool to help the user understand and/or assess potential diagnostic and treatment options. It does NOT  include all information about conditions, treatments, medications, side effects, or risks that may apply to a specific patient. It is not intended to be medical advice or a substitute for the medical advice, diagnosis, or treatment of a health care provider based on the health care provider's examination and assessment of a patient’s specific and unique circumstances. Patients must speak with a health care provider for complete information about their health, medical questions, and treatment options, including any risks or benefits regarding use of medications. This information does not endorse any treatments or medications as safe, effective, or approved for treating a specific patient. UpToDate, Inc. and its affiliates disclaim any warranty or liability relating to this information or the use thereof. The use of this information is governed by the Terms of Use, available at https://www.[employer]en/know/clinical-effectiveness-terms   Copyright   Copyright © 2024 UpToDate, Inc. and its affiliates and/or licensors. All rights reserved.    Patient Education     Kidney stones in adults   The Basics   Written by the doctors and editors at [employer]   What are kidney stones? -- Kidney stones are just what they sound like: small stones that form inside the kidneys. They form when salts and minerals that are normally in urine build up and harden.  Kidney stones usually get carried out of the body when you urinate. But sometimes, they can get stuck on the way out (figure 1). If that happens, the stones can cause:   Pain in your side or in the lower part of your belly   Blood in your urine (which can make urine pink or red)   Nausea or vomiting   Pain when you urinate   Needing to urinate in a hurry  How do I know if I have kidney stones? -- If your doctor or nurse thinks that you have kidney stones, they can order an imaging test that can show the stones. (Imaging tests create pictures of the inside of the  "body.)  How are kidney stones treated? -- Each person's treatment is a little different. The right treatment for you will depend on:   The size, type, and location of your stone   How much pain you have   How much you are vomiting  If your stone is small and causes only mild symptoms, you might be able to stay home and wait for it to pass in your urine. If you are going to try this, your doctor will tell you what to do. This usually includes:   Drinking lots of fluids   Taking pain medicines or medicines that make it easier to pass the stone   Urinating through a strainer so you can catch the stone when it comes out  If your stone is big or causes severe symptoms, you might need treatment in the hospital. Kidney stones that do not pass on their own can be treated with:   \"Shock wave lithotripsy\" - A machine uses sound waves to break up stones into smaller pieces. This procedure does not involve surgery, but it can be painful.   \"Percutaneous nephrolithotomy\" - This is a special kind of surgery in which a doctor makes very small holes in your skin. The doctor passes tiny tools through the holes and into your kidney. Then, they remove the stone.   \"Ureteroscopy\" - A doctor puts a thin tube into your body the same way urine comes out. They use tools at the end of the tube to break up or remove stones.  What problems should I watch for? -- If you are trying to pass a kidney stone at home, call your doctor or nurse for advice if:   You do not urinate for more than 8 hours.   You have a fever of 100.4°F (38°C) or higher, or chills.   Your urine is cloudy, smells bad, or has more blood in it than before.   The pain from your kidney stone gets very bad, and taking pain medicine doesn't help.   You are vomiting and can't keep liquids down.   Your pain does not go away after 1 to 2 weeks  What can I do to prevent getting kidney stones again? -- Drink plenty of water. You might also need to change what you eat, depending on " what your kidney stones were made of. If so, your doctor or nurse can tell you which foods to avoid. Your doctor or nurse might also prescribe you new medicines to keep you from having another kidney stone.  All topics are updated as new evidence becomes available and our peer review process is complete.  This topic retrieved from [employer] on: May 09, 2024.  Topic 49998 Version 19.0  Release: 32.4.3 - C32.128  © 2024 UpToDate, Inc. and/or its affiliates. All rights reserved.  figure 1: Anatomy of the urinary tract     Urine is made by the kidneys. It passes from the kidneys into the bladder through 2 tubes called the ureters. Then, it leaves the bladder through another tube called the urethra.  Graphic 56561 Version 8.0  Consumer Information Use and Disclaimer   Disclaimer: This generalized information is a limited summary of diagnosis, treatment, and/or medication information. It is not meant to be comprehensive and should be used as a tool to help the user understand and/or assess potential diagnostic and treatment options. It does NOT include all information about conditions, treatments, medications, side effects, or risks that may apply to a specific patient. It is not intended to be medical advice or a substitute for the medical advice, diagnosis, or treatment of a health care provider based on the health care provider's examination and assessment of a patient's specific and unique circumstances. Patients must speak with a health care provider for complete information about their health, medical questions, and treatment options, including any risks or benefits regarding use of medications. This information does not endorse any treatments or medications as safe, effective, or approved for treating a specific patient. UpToDate, Inc. and its affiliates disclaim any warranty or liability relating to this information or the use thereof.The use of this information is governed by the Terms of Use, available at  https://www.woltersJuvaris BioTherapeuticsuwer.com/en/know/clinical-effectiveness-terms. 2024© [employer] and its affiliates and/or licensors. All rights reserved.  Copyright   © 2024 [employer] and/or its affiliates. All rights reserved.

## 2024-08-21 LAB
COLOR STONE: NORMAL
COM MFR STONE: 100 %
COMMENT-STONE3: NORMAL
COMPOSITION: NORMAL
LABORATORY COMMENT REPORT: NORMAL
PHOTO: NORMAL
SIZE STONE: NORMAL MM
SPEC SOURCE SUBJ: NORMAL
STONE ANALYSIS-IMP: NORMAL
WT STONE: 18 MG